# Patient Record
Sex: MALE | Race: BLACK OR AFRICAN AMERICAN
[De-identification: names, ages, dates, MRNs, and addresses within clinical notes are randomized per-mention and may not be internally consistent; named-entity substitution may affect disease eponyms.]

---

## 2018-02-20 ENCOUNTER — HOSPITAL ENCOUNTER (INPATIENT)
Dept: HOSPITAL 88 - ER | Age: 68
LOS: 1 days | DRG: 208 | End: 2018-02-21
Attending: INTERNAL MEDICINE | Admitting: INTERNAL MEDICINE
Payer: MEDICARE

## 2018-02-20 VITALS — SYSTOLIC BLOOD PRESSURE: 226 MMHG | DIASTOLIC BLOOD PRESSURE: 164 MMHG

## 2018-02-20 VITALS — DIASTOLIC BLOOD PRESSURE: 75 MMHG | SYSTOLIC BLOOD PRESSURE: 96 MMHG

## 2018-02-20 VITALS — DIASTOLIC BLOOD PRESSURE: 83 MMHG | SYSTOLIC BLOOD PRESSURE: 109 MMHG

## 2018-02-20 VITALS — SYSTOLIC BLOOD PRESSURE: 120 MMHG | DIASTOLIC BLOOD PRESSURE: 97 MMHG

## 2018-02-20 VITALS — SYSTOLIC BLOOD PRESSURE: 104 MMHG | DIASTOLIC BLOOD PRESSURE: 79 MMHG

## 2018-02-20 VITALS — DIASTOLIC BLOOD PRESSURE: 25 MMHG | SYSTOLIC BLOOD PRESSURE: 71 MMHG

## 2018-02-20 VITALS — DIASTOLIC BLOOD PRESSURE: 76 MMHG | SYSTOLIC BLOOD PRESSURE: 93 MMHG

## 2018-02-20 VITALS — DIASTOLIC BLOOD PRESSURE: 88 MMHG | SYSTOLIC BLOOD PRESSURE: 123 MMHG

## 2018-02-20 VITALS — DIASTOLIC BLOOD PRESSURE: 95 MMHG | SYSTOLIC BLOOD PRESSURE: 132 MMHG

## 2018-02-20 VITALS — DIASTOLIC BLOOD PRESSURE: 150 MMHG | SYSTOLIC BLOOD PRESSURE: 212 MMHG

## 2018-02-20 VITALS — WEIGHT: 172 LBS | HEIGHT: 68 IN | BODY MASS INDEX: 26.07 KG/M2

## 2018-02-20 VITALS — DIASTOLIC BLOOD PRESSURE: 81 MMHG | SYSTOLIC BLOOD PRESSURE: 102 MMHG

## 2018-02-20 VITALS — DIASTOLIC BLOOD PRESSURE: 95 MMHG | SYSTOLIC BLOOD PRESSURE: 126 MMHG

## 2018-02-20 VITALS — SYSTOLIC BLOOD PRESSURE: 128 MMHG | DIASTOLIC BLOOD PRESSURE: 96 MMHG

## 2018-02-20 VITALS — DIASTOLIC BLOOD PRESSURE: 129 MMHG | SYSTOLIC BLOOD PRESSURE: 188 MMHG

## 2018-02-20 VITALS — DIASTOLIC BLOOD PRESSURE: 83 MMHG | SYSTOLIC BLOOD PRESSURE: 106 MMHG

## 2018-02-20 VITALS — DIASTOLIC BLOOD PRESSURE: 158 MMHG | SYSTOLIC BLOOD PRESSURE: 215 MMHG

## 2018-02-20 VITALS — SYSTOLIC BLOOD PRESSURE: 101 MMHG | DIASTOLIC BLOOD PRESSURE: 83 MMHG

## 2018-02-20 VITALS — SYSTOLIC BLOOD PRESSURE: 121 MMHG | DIASTOLIC BLOOD PRESSURE: 95 MMHG

## 2018-02-20 VITALS — SYSTOLIC BLOOD PRESSURE: 119 MMHG | DIASTOLIC BLOOD PRESSURE: 89 MMHG

## 2018-02-20 VITALS — DIASTOLIC BLOOD PRESSURE: 99 MMHG | SYSTOLIC BLOOD PRESSURE: 155 MMHG

## 2018-02-20 VITALS — SYSTOLIC BLOOD PRESSURE: 68 MMHG | DIASTOLIC BLOOD PRESSURE: 48 MMHG

## 2018-02-20 VITALS — SYSTOLIC BLOOD PRESSURE: 103 MMHG | DIASTOLIC BLOOD PRESSURE: 80 MMHG

## 2018-02-20 DIAGNOSIS — N17.9: ICD-10-CM

## 2018-02-20 DIAGNOSIS — Z85.841: ICD-10-CM

## 2018-02-20 DIAGNOSIS — I10: ICD-10-CM

## 2018-02-20 DIAGNOSIS — Z95.5: ICD-10-CM

## 2018-02-20 DIAGNOSIS — E87.5: ICD-10-CM

## 2018-02-20 DIAGNOSIS — I46.9: ICD-10-CM

## 2018-02-20 DIAGNOSIS — J96.00: Primary | ICD-10-CM

## 2018-02-20 DIAGNOSIS — E87.4: ICD-10-CM

## 2018-02-20 DIAGNOSIS — I25.10: ICD-10-CM

## 2018-02-20 DIAGNOSIS — C34.90: ICD-10-CM

## 2018-02-20 DIAGNOSIS — Z87.891: ICD-10-CM

## 2018-02-20 DIAGNOSIS — Z66: ICD-10-CM

## 2018-02-20 DIAGNOSIS — C79.31: ICD-10-CM

## 2018-02-20 LAB
ALBUMIN SERPL-MCNC: 3 G/DL (ref 3.5–5)
ALBUMIN SERPL-MCNC: 3.4 G/DL (ref 3.5–5)
ALBUMIN/GLOB SERPL: 0.9 {RATIO} (ref 0.8–2)
ALBUMIN/GLOB SERPL: 0.9 {RATIO} (ref 0.8–2)
ALP SERPL-CCNC: 69 IU/L (ref 40–150)
ALP SERPL-CCNC: 79 IU/L (ref 40–150)
ALT SERPL-CCNC: 20 IU/L (ref 0–55)
ALT SERPL-CCNC: 71 IU/L (ref 0–55)
ANION GAP SERPL CALC-SCNC: 12.9 MMOL/L (ref 8–16)
ANION GAP SERPL CALC-SCNC: 24.2 MMOL/L (ref 8–16)
BACTERIA URNS QL MICRO: (no result) /HPF
BASE EXCESS BLDA CALC-SCNC: -11 MMOL/L (ref -2–3)
BASOPHILS # BLD AUTO: 0 10*3/UL (ref 0–0.1)
BASOPHILS # BLD AUTO: 0 10*3/UL (ref 0–0.1)
BASOPHILS NFR BLD AUTO: 0.1 % (ref 0–1)
BASOPHILS NFR BLD AUTO: 0.3 % (ref 0–1)
BILIRUB UR QL: NEGATIVE
BUN SERPL-MCNC: 21 MG/DL (ref 7–26)
BUN SERPL-MCNC: 26 MG/DL (ref 7–26)
BUN/CREAT SERPL: 13 (ref 6–25)
BUN/CREAT SERPL: 15 (ref 6–25)
CALCIUM SERPL-MCNC: 8 MG/DL (ref 8.4–10.2)
CALCIUM SERPL-MCNC: 8.7 MG/DL (ref 8.4–10.2)
CHLORIDE SERPL-SCNC: 112 MMOL/L (ref 98–107)
CHLORIDE SERPL-SCNC: 113 MMOL/L (ref 98–107)
CK MB SERPL-MCNC: 1.9 NG/ML (ref 0–5)
CK MB SERPL-MCNC: 4.4 NG/ML (ref 0–4.3)
CK SERPL-CCNC: 130 IU/L (ref 30–200)
CK SERPL-CCNC: 99 IU/L (ref 30–200)
CLARITY UR: (no result)
CO2 SERPL-SCNC: 22 MMOL/L (ref 22–29)
CO2 SERPL-SCNC: 7 MMOL/L (ref 22–29)
COLOR UR: YELLOW
DEPRECATED NEUTROPHILS # BLD AUTO: 12 10*3/UL (ref 2.1–6.9)
DEPRECATED NEUTROPHILS # BLD AUTO: 9.4 10*3/UL (ref 2.1–6.9)
DEPRECATED PHOSPHATE SERPL-MCNC: 8.6 MG/DL (ref 2.3–4.7)
DEPRECATED RBC URNS MANUAL-ACNC: (no result) /HPF (ref 0–5)
EGFRCR SERPLBLD CKD-EPI 2021: 39 ML/MIN (ref 60–?)
EGFRCR SERPLBLD CKD-EPI 2021: 52 ML/MIN (ref 60–?)
EOSINOPHIL # BLD AUTO: 0 10*3/UL (ref 0–0.4)
EOSINOPHIL # BLD AUTO: 0 10*3/UL (ref 0–0.4)
EOSINOPHIL NFR BLD AUTO: 0.3 % (ref 0–6)
EOSINOPHIL NFR BLD AUTO: 0.3 % (ref 0–6)
EPI CELLS URNS QL MICRO: (no result) /LPF
ERYTHROCYTE [DISTWIDTH] IN CORD BLOOD: 20.8 % (ref 11.7–14.4)
ERYTHROCYTE [DISTWIDTH] IN CORD BLOOD: 21 % (ref 11.7–14.4)
GLOBULIN PLAS-MCNC: 3.4 G/DL (ref 2.3–3.5)
GLOBULIN PLAS-MCNC: 3.6 G/DL (ref 2.3–3.5)
GLUCOSE SERPLBLD-MCNC: 192 MG/DL (ref 74–118)
GLUCOSE SERPLBLD-MCNC: 94 MG/DL (ref 74–118)
HCO3 BLDA-SCNC: 18 MMOL/L (ref 23–28)
HCT VFR BLD AUTO: 36.2 % (ref 38.2–49.6)
HCT VFR BLD AUTO: 39.7 % (ref 38.2–49.6)
HGB BLD-MCNC: 10.4 G/DL (ref 14–18)
HGB BLD-MCNC: 10.8 G/DL (ref 14–18)
KETONES UR QL STRIP.AUTO: NEGATIVE
LEUKOCYTE ESTERASE UR QL STRIP.AUTO: NEGATIVE
LYMPHOCYTES # BLD: 0.6 10*3/UL (ref 1–3.2)
LYMPHOCYTES # BLD: 1.9 10*3/UL (ref 1–3.2)
LYMPHOCYTES NFR BLD AUTO: 13.2 % (ref 18–39.1)
LYMPHOCYTES NFR BLD AUTO: 6.2 % (ref 18–39.1)
MAGNESIUM SERPL-MCNC: 2 MG/DL (ref 1.3–2.1)
MCH RBC QN AUTO: 25.7 PG (ref 28–32)
MCH RBC QN AUTO: 25.7 PG (ref 28–32)
MCHC RBC AUTO-ENTMCNC: 26.2 G/DL (ref 31–35)
MCHC RBC AUTO-ENTMCNC: 29.8 G/DL (ref 31–35)
MCV RBC AUTO: 86.2 FL (ref 81–99)
MCV RBC AUTO: 98 FL (ref 81–99)
MONOCYTES # BLD AUTO: 0.1 10*3/UL (ref 0.2–0.8)
MONOCYTES # BLD AUTO: 0.5 10*3/UL (ref 0.2–0.8)
MONOCYTES NFR BLD AUTO: 1.2 % (ref 4.4–11.3)
MONOCYTES NFR BLD AUTO: 3.2 % (ref 4.4–11.3)
NEUTS SEG NFR BLD AUTO: 81.1 % (ref 38.7–80)
NEUTS SEG NFR BLD AUTO: 91.3 % (ref 38.7–80)
NITRITE UR QL STRIP.AUTO: NEGATIVE
PCO2 BLDA: 58 MMHG (ref 41–51)
PCO2 BLDA: 73 MMHG (ref 80–105)
PH BLDA: 7.1 [PH] (ref 7.31–7.41)
PLATELET # BLD AUTO: 273 X10E3/UL (ref 140–360)
PLATELET # BLD AUTO: 315 X10E3/UL (ref 140–360)
POTASSIUM SERPL-SCNC: 3.9 MMOL/L (ref 3.5–5.1)
POTASSIUM SERPL-SCNC: 6.2 MMOL/L (ref 3.5–5.1)
PROT UR QL STRIP.AUTO: (no result)
RBC # BLD AUTO: 4.05 X10E6/UL (ref 4.3–5.7)
RBC # BLD AUTO: 4.2 X10E6/UL (ref 4.3–5.7)
SAO2 % BLDA: 88 % (ref 95–98)
SODIUM SERPL-SCNC: 138 MMOL/L (ref 136–145)
SODIUM SERPL-SCNC: 143 MMOL/L (ref 136–145)
SP GR UR STRIP: 1.02 (ref 1.01–1.02)
UROBILINOGEN UR STRIP-MCNC: 0.2 MG/DL (ref 0.2–1)

## 2018-02-20 PROCEDURE — 94002 VENT MGMT INPAT INIT DAY: CPT

## 2018-02-20 PROCEDURE — 83605 ASSAY OF LACTIC ACID: CPT

## 2018-02-20 PROCEDURE — 83735 ASSAY OF MAGNESIUM: CPT

## 2018-02-20 PROCEDURE — 86900 BLOOD TYPING SEROLOGIC ABO: CPT

## 2018-02-20 PROCEDURE — 80053 COMPREHEN METABOLIC PANEL: CPT

## 2018-02-20 PROCEDURE — 81001 URINALYSIS AUTO W/SCOPE: CPT

## 2018-02-20 PROCEDURE — 5A12012 PERFORMANCE OF CARDIAC OUTPUT, SINGLE, MANUAL: ICD-10-PCS | Performed by: EMERGENCY MEDICINE

## 2018-02-20 PROCEDURE — 87205 SMEAR GRAM STAIN: CPT

## 2018-02-20 PROCEDURE — 96375 TX/PRO/DX INJ NEW DRUG ADDON: CPT

## 2018-02-20 PROCEDURE — 82805 BLOOD GASES W/O2 SATURATION: CPT

## 2018-02-20 PROCEDURE — 87040 BLOOD CULTURE FOR BACTERIA: CPT

## 2018-02-20 PROCEDURE — 0BH17EZ INSERTION OF ENDOTRACHEAL AIRWAY INTO TRACHEA, VIA NATURAL OR ARTIFICIAL OPENING: ICD-10-PCS | Performed by: EMERGENCY MEDICINE

## 2018-02-20 PROCEDURE — 82550 ASSAY OF CK (CPK): CPT

## 2018-02-20 PROCEDURE — 36600 WITHDRAWAL OF ARTERIAL BLOOD: CPT

## 2018-02-20 PROCEDURE — 85025 COMPLETE CBC W/AUTO DIFF WBC: CPT

## 2018-02-20 PROCEDURE — 3E043XZ INTRODUCTION OF VASOPRESSOR INTO CENTRAL VEIN, PERCUTANEOUS APPROACH: ICD-10-PCS | Performed by: EMERGENCY MEDICINE

## 2018-02-20 PROCEDURE — 82553 CREATINE MB FRACTION: CPT

## 2018-02-20 PROCEDURE — 70450 CT HEAD/BRAIN W/O DYE: CPT

## 2018-02-20 PROCEDURE — 87086 URINE CULTURE/COLONY COUNT: CPT

## 2018-02-20 PROCEDURE — 93306 TTE W/DOPPLER COMPLETE: CPT

## 2018-02-20 PROCEDURE — 99284 EMERGENCY DEPT VISIT MOD MDM: CPT

## 2018-02-20 PROCEDURE — 86850 RBC ANTIBODY SCREEN: CPT

## 2018-02-20 PROCEDURE — 93005 ELECTROCARDIOGRAM TRACING: CPT

## 2018-02-20 PROCEDURE — 5A1935Z RESPIRATORY VENTILATION, LESS THAN 24 CONSECUTIVE HOURS: ICD-10-PCS | Performed by: EMERGENCY MEDICINE

## 2018-02-20 PROCEDURE — 71045 X-RAY EXAM CHEST 1 VIEW: CPT

## 2018-02-20 PROCEDURE — 84484 ASSAY OF TROPONIN QUANT: CPT

## 2018-02-20 PROCEDURE — 94640 AIRWAY INHALATION TREATMENT: CPT

## 2018-02-20 PROCEDURE — 36415 COLL VENOUS BLD VENIPUNCTURE: CPT

## 2018-02-20 PROCEDURE — 82948 REAGENT STRIP/BLOOD GLUCOSE: CPT

## 2018-02-20 PROCEDURE — 87071 CULTURE AEROBIC QUANT OTHER: CPT

## 2018-02-20 PROCEDURE — 84100 ASSAY OF PHOSPHORUS: CPT

## 2018-02-20 RX ADMIN — Medication PRN ML: at 22:20

## 2018-02-20 RX ADMIN — NITROGLYCERIN SCH GM: 20 OINTMENT TOPICAL at 18:53

## 2018-02-20 RX ADMIN — Medication PRN ML: at 21:20

## 2018-02-20 RX ADMIN — SODIUM CHLORIDE SCH GM: 9 INJECTION, SOLUTION INTRAVENOUS at 22:00

## 2018-02-20 RX ADMIN — Medication PRN ML: at 20:35

## 2018-02-20 RX ADMIN — Medication PRN ML: at 20:30

## 2018-02-20 RX ADMIN — Medication PRN ML: at 22:10

## 2018-02-20 RX ADMIN — Medication PRN ML: at 21:00

## 2018-02-20 RX ADMIN — Medication PRN ML: at 20:45

## 2018-02-20 RX ADMIN — Medication PRN ML: at 21:10

## 2018-02-20 RX ADMIN — Medication PRN ML: at 21:15

## 2018-02-20 RX ADMIN — NITROGLYCERIN SCH GM: 20 OINTMENT TOPICAL at 21:00

## 2018-02-20 RX ADMIN — Medication PRN ML: at 22:00

## 2018-02-20 RX ADMIN — Medication PRN ML: at 20:40

## 2018-02-20 RX ADMIN — Medication PRN ML: at 22:30

## 2018-02-20 NOTE — DIAGNOSTIC IMAGING REPORT
EXAMINATION:  CHEST SINGLE (PORTABLE)    



INDICATION:           Status post rapid response and intubation. 



COMPARISON:  None

     

FINDINGS:

TUBES and LINES:  Endotracheal tube is in good position below the thoracic

inlet in the distribution of the trachea 6.3 cm above the dia. There is a

left IJ central line catheter with tip overlying the left innominate vein with

a short segment of catheter looping in the distribution of the IJ.



LUNGS:  Right upper lobe airspace opacity  as associated with interlobular

septi thickening.      



PLEURA:  Pleural-based opacity in the right apex may represent loculated

effusion or pleural-based mass.



HEART AND MEDIASTINUM:  The cardiomediastinal silhouette is unremarkable. The

aorta is ectatic.



BONES AND SOFT TISSUES:  No acute osseous lesion.  Soft tissues are

unremarkable.



UPPER ABDOMEN: No free air under the diaphragm.    



IMPRESSION: 

Findings in the chest are suspicious for right upper lobe pneumonia, diffuse

interstitial pattern of fluid overload and right apical pleural based opacity,

indeterminate. CT of the chest with IV contrast is recommended.





Signed by: Dr. Chance Segura M.D. on 2/20/2018 9:33 PM

## 2018-02-20 NOTE — DIAGNOSTIC IMAGING REPORT
Examination: CT BRAIN WITHOUT CONTRAST



History:Syncope.

Comparison studies:None



Technique:

Axial images were obtained from the skull base to the vertex.

Coronal and sagittal images reconstructed from the axial data.

Intravenous contrast: None



Findings:



Scalp: No abnormalities.

Bones: Prior left suboccipital craniectomy and left occipital craniotomy and

deep to the craniectomy and craniotomy, there is cortical based area of

encephalomalacia involving the left mid and inferior cerebellum. Prior right

frontal griselda hole. No fractures, blastic or lytic lesions.



Brain sulci: Moderate volume loss for age.

Ventricles: Normal in size and configuration. No hydrocephalus.



Extra-axial space:

No abnormalities.



Parenchyma: 

A 4 mm dystrophic calcification is demonstrated in the right frontal

periventricular white matter, which could be related to prior infectious or

inflammatory process.

Chronic lacunar infarct is demonstrated in the right caudate head. 

There are mild  confluent areas of hypoattenuation in the periventricular and

subcortical white matter, nonspecific. 

No masses, hemorrhage, or acute cortical based vascular insults.



Sellar/suprasellar region: Partially CSF filled.

Craniocervical junction: Patent foramen magnum. No Chiari one malformation.



Incidental findings: 

Atherosclerotic calcification of the cavernous and supraclinoid internal

carotid arteries.



Impression:

 

1.  No acute intracranial abnormalities.



2.  Mild chronic microvascular ischemic change.



3.  Moderate volume loss for age.



4.  Right caudate head chronic lacunar infarct.



5.  Prior left suboccipital craniectomy and left occipital craniotomy with

cortical based area of encephalomalacia of the left mid and inferior

cerebellum.



6.  Prior right frontal griselda hole.



7.  Dystrophic calcification right frontal periventricular white matter, which

is the sequela of prior infectious or inflammatory process.



Signed by: Dr. Leonarda Chakraborty M.D. on 2/20/2018 3:15 PM

## 2018-02-20 NOTE — DIAGNOSTIC IMAGING REPORT
PROCEDURE:

A single AP view of the chest.

 

COMPARISON: None.

 

INDICATIONS:   SYNCOPE

     

FINDINGS:

Lines/tubes:  None.

 

Lungs:  The lungs are well inflated. Questionable ill-defined opacity 

in the right upper lobe. Rest of the lungs grossly clear..

 

Pleura:  There is no pleural effusion or pneumothorax.

 

Heart and mediastinum: Cardiac silhouette is unremarkable. Pulmonary 

vasculature is normal.

 

Bones:  No acute bony abnormality.

 

IMPRESSION: 

 

1. questionable ill-defined opacity in the right upper lobe, which may 

be secondary to aspiration in this patient with history of syncope. 

Chest PA and lateral would be helpful for further evaluation. 

 

 

Phill Cheema M.D.  

Dictated by:  Phill Cheema M.D. on 2/20/2018 at 15:37     

Electronically approved by:  Phill Cheema M.D. on 2/20/2018 at 

15:37

## 2018-02-20 NOTE — XMS REPORT
Patient Summary Document

 Created on: 2018



GARRISON BRANTLEY

External Reference #: [id number]

: 1950

Sex: Male



Demographics







 Address  [address] [place]  [address]

 

 Home Phone  [phone]

 

 Preferred Language  Unknown

 

 Marital Status  Unknown

 

 [employer]ist Affiliation  Unknown

 

 Race  Unknown

 

 Ethnic Group  Unknown





Author







 [facility]  [facility]

 

 Address  Unknown

 

 Phone  Unavailable







Care Team Providers







 Care Team Member Name  Role  Phone

 

 ANA SELBY  Unavailable  Unavailable







Problems

This patient has no known problems.



Allergies, Adverse Reactions, Alerts

This patient has no known allergies or adverse reactions.



Medications

This patient has no known medications.



Encounters







 Start Date/Time  End Date/Time  Encounter Type  Admission Type  Attending 
[facility]  Care Facility  Care Department  Encounter ID

 

 2018 23:29:44  2018 23:29:44  Emergency        [facility]  [id number]

 

 2018 19:57:38  2018 19:57:38  Emergency        [facility]  [id number]

 

 2018 18:55:18  2018 18:55:18  Emergency        [facility]  [id number]

 

 2017 00:00:00  2017 00:00:00  Outpatient        [facility]  [id number]

 

 2017 00:00:00  2017 00:00:00  Outpatient        [facility]  [id number]

 

 2017-10-13 13:57:37  2017-10-13 13:57:37  Outpatient        [facility]  [id number]

 

 2017-10-13 11:59:10  2017-10-13 11:59:10  Outpatient        [facility]  [id number]

 

 2017 10:51:56  2017 10:51:56  Outpatient        [facility]  [id number]

 

 2017 00:00:00  2017 00:00:00  Outpatient        [facility]  [id number]

 

 2017 00:00:00  2017 00:00:00  Outpatient        [facility]  [id number]

 

 2017 00:00:00  2017 00:00:00  Outpatient        [facility]  [id number]

 

 2017 08:47:46  2017 08:47:46  Outpatient        [facility]  [id number]







Results







 Test Description  Test Time  Test Comments  Text Results  Atomic Results  
Result Comments









 CT BRAIN [facility]   [address] [address]      Patient Name: GARRISON BRANTLEY   MR 
#: [id number]    : 1950 Age/Sex: 67/M  Acct #: [id number] Req #: [id number]
[id number]  Adm Physician:     Ordered by: ANA SELBY MD  Report #: [id number]
[id number]   Location: ER  Room/Bed:     _____________________________________________
______________________________________________________    Procedure: [id number] 
CT/CT BRAIN WO  Exam Date: 18                            Exam Time: 1450 
      REPORT STATUS: Signed    Examination: CT BRAIN WITHOUT CONTRAST      
History:Syncope.   Comparison studies:None      Technique:   Axial images were 
obtained from the skull base to the vertex.   Coronal and sagittal images 
reconstructed from the axial data.   Intravenous contrast: None      Findings: 
     Scalp: No abnormalities.   Bones: Prior left suboccipital craniectomy and 
left occipital craniotomy and   deep to the craniectomy and craniotomy, there 
is cortical based area of   encephalomalacia involving the left mid and 
inferior cerebellum. Prior right   frontal griselda hole. No fractures, blastic or 
lytic lesions.      Brain sulci: Moderate volume loss for age.   Ventricles: 
Normal in size and configuration. No hydrocephalus.      Extra-axial space:   
No abnormalities.      Parenchyma:    A 4 mm dystrophic calcification is 
demonstrated in the right frontal   periventricular white matter, which could 
be related to prior infectious or   inflammatory process.   Chronic lacunar 
infarct is demonstrated in the right caudate head.    There are mild  confluent 
areas of hypoattenuation in the periventricular and   subcortical white matter, 
nonspecific.    No masses, hemorrhage, or acute cortical based vascular 
insults.      Sellar/suprasellar region: Partially CSF filled.   Craniocervical 
junction: Patent foramen magnum. No Chiari one malformation.      Incidental 
findings:    Atherosclerotic calcification of the cavernous and supraclinoid 
internal   carotid arteries.      Impression:       1.  No acute intracranial 
abnormalities.      2.  Mild chronic microvascular ischemic change.      3.  
Moderate volume loss for age.      4.  Right caudate head chronic lacunar 
infarct.      5.  Prior left suboccipital craniectomy and left occipital 
craniotomy with   cortical based area of encephalomalacia of the left mid and 
inferior   cerebellum.      6.  Prior right frontal griselda hole.      7.  
Dystrophic calcification right frontal periventricular white matter, which   is 
the sequela of prior infectious or inflammatory process.      Signed by: Dr. Leonarda Chakraborty M.D. on 2018 3:15 PM        Dictated By: LEONARDA LEBRON MD  Electronically Signed By: LEONARDA OBRIEN MD on 18  Transcribed By: TORITO on 18       COPY TO:   ANA SELBY MD           

 

 CHEST SINGLE (NOT PORTABLE)            [address] [place] [address]      Patient Name: 
GARRISON BRANTLEY   MR #: [id number]    : 1950 Age/Sex: 67/M  Acct #: 
[id number] Req #: [id number]  Adm Physician:     Ordered by: ANA SELBY MD  Report #: [id number]   Location: ER  Room/Bed:     __________________________
_________________________________________________________________________    
Procedure: [id number] DX/CHEST SINGLE (NOT PORTABLE)  Exam Date: 18       
                     Exam Time: 1450       REPORT STATUS: Signed    PROCEDURE: 
  A single AP view of the chest.       COMPARISON: None.       INDICATIONS:   
SYNCOPE           FINDINGS:   Lines/tubes:  None.       Lungs:  The lungs are 
well inflated. Questionable ill-defined opacity    in the right upper lobe. 
Rest of the lungs grossly clear..       Pleura:  There is no pleural effusion 
or pneumothorax.       Heart and mediastinum: Cardiac silhouette is 
unremarkable. Pulmonary    vasculature is normal.       Bones:  No acute bony 
abnormality.       IMPRESSION:        1. questionable ill-defined opacity in 
the right upper lobe, which may    be secondary to aspiration in this patient 
with history of syncope.    Chest PA and lateral would be helpful for further 
evaluation.            Vitaly Cheema M.D.     Dictated by:  Vitaly Cheema M.D. on 2018 at 15:37        Electronically approved by:  Vitaly Cheema M.D. on 2018 at    15:37                Dictated By: VITALY CHEEMA MD  Electronically Signed By: VITALY CHEEMA MD on 18 1537  
Transcribed By: ELIZABETH on 18 1537       COPY TO:   ANA SELBY MD

## 2018-02-20 NOTE — DIAGNOSTIC IMAGING REPORT
Exam: Head CT without contrast

History:  Syncope, history of lung cancer

Comparison studies:  Head CT 2/20/2018



Technique:

Axial images were obtained from the skull base to the vertex.

Coronal and sagittal images reconstructed from the axial data.

Intravenous contrast: None



Findings:



Scalp and bones: No acute abnormalities. Surgical changes of left suboccipital

craniotomy.



Brain sulci: Mildly prominent.

Ventricles: Mild compensatory dilatation. No hydrocephalus.

Extra-axial spaces: No masses, no fluid collection. 



Parenchyma: 

No gross acute hemorrhage or acute cortical vascular insults. Chronic lacunar

insult in the head of the right caudate nucleus is unchanged. A few scattered

hypodensities in the supratentorial white matter are nonspecific but most

compatible with chronic small vessel ischemic changes. Subcentimeter

calcifications in the right frontal corona radiata is without surrounding edema

or mass effect. Subcentimeter calcification in the right cerebellar hemisphere

is with subtle surrounding hypodensity which may reflect gliosis or edema and

is otherwise without significant mass effect.



Unchanged encephalomalacia in the posterior left cerebellum beneath a left

suboccipital craniotomy. Sellar/suprasellar region: No abnormalities.

Craniocervical junction: Patent foramen magnum. No Chiari one malformation.





IMPRESSION:



Exam is limited by artifacts related to patient motion.

In spite of this limitation:

 

No gross acute intracranial abnormalities.

No gross changes from the previous head CT of 2/20/2018.



Persistent findings:

1.  Mild generalized volume loss.

2.  Mild chronic microvascular ischemic changes and chronic lacunar insult in

the right caudate nucleus.

3.  Encephalomalacia in the left cerebellum beneath a left suboccipital

craniotomy.

4.  Nonspecific right cerebellar subcentimeter calcification with surrounding

gliosis or edema without mass effect.

5.  Incidental dystrophic right frontal calcification.

6.  Old right frontal griselda hole.



If there remains persistent concern for metastatic disease, recommend brain MRI

without/with IV contrast to further evaluate when clinically appropriate for

the patient.



Signed by: Dr. Paras Tomas M.D. on 2/20/2018 10:07 PM

## 2018-02-21 RX ADMIN — SODIUM CHLORIDE SCH GM: 9 INJECTION, SOLUTION INTRAVENOUS at 05:43

## 2018-02-21 NOTE — CONSULTATION
DATE OF CONSULTATION:  2018 



PULMONARY MEDICINE CONSULT



REASON FOR REFERRAL:  Respiratory failure.



HISTORY:  Mr. Gould is a pleasant 67-year-old gentleman with respiratory 

failure.  Patient presented with syncope.  Patient came to emergency room.  

He had received chemotherapy reportedly today.  When he came to the 

emergency room, it was seen that he retained a lot of altered sensorium.  

Patient was admitted to the hospital.  Patient seemed to worsen.  During 

the course of the day, he went to more difficulty breathing.  He was 

eventually intubated.  He went to cardiac arrest x2.  He was seemed to be 

increasingly acidotic with acute kidney failure.  Patient was not on 

pressors, but it was noted he was having difficulty.  Family made him DNR 

during the course of the day and opted for more conservative treatments.  

During the course, he  on 2018 at 0032 hours.



PAST MEDICAL HISTORY:  Hypertension, stage 4 lung cancer including brain 

metastasis.



MEDICATIONS AND ALLERGIES:  Reviewed per record.  NOTABLY, THERE WERE NO 

KNOWN DRUG ALLERGIES.



SOCIAL HISTORY:  Patient unable to provide social history.  Multiple family 

at beside.



FAMILY HISTORY:  Noncontributory.



REVIEW OF SYSTEMS:  Cannot get as he is intubated.



OBJECTIVE:

VITAL SIGNS:  Unstable per record.  He appears to be passing away.

GENERAL:  Intubated, on ventilator.

HEENT:  Normocephalic, atraumatic.

NECK:  Supple.  Throat midline.

LUNGS:  Bilateral air entry, few rhonchi.

CARDIOVASCULAR:  S1 and S2.  No murmurs, rubs, or gallops.

ABDOMEN:  Soft, nontender.

EXTREMITIES:  No clubbing, no cyanosis.

INTEGUMENT:  No rash, no purpura.



LABS:  Labs have worsened significantly.  __________ bicarbonate, 6.2 

potassium, 26 BUN, 2.1 creatinine.  15 white count, 39 hematocrit, 315,000 

platelets.



IMPRESSION AND PLAN:

1. Acute respiratory failure.

2. Recurrent cardiac arrest.

3. Stage 4 lung cancer.

4. Acute kidney injury.

5. Worsening hyperkalemia.

6. Severe metabolic acidosis and additional respiratory acidosis.

7. Multiple issues addressed.

8. Do not resuscitate.



Patient was later declared .  Time of death was 0032 hours on 

2018.  Family was at bedside and they were notified.  

Discussed with other practitioners.



Thank you very much for this consult.  My condolences out to the family.



 



DD:  2018 00:58

DT:  2018 01:01

Job#:  [id number]

## 2018-02-22 NOTE — CONSULTATION
DATE OF CONSULTATION:  February 20, 2018 



CRITICAL CARE/CARDIOVASCULAR CONSULTATION NOTE



REASON FOR CONSULTATION:  Cardiac arrest.



HPI:  Mr. Gould is a 67-year-old gentleman with a history of coronary artery 

disease, prior stent placement, on dual antiplatelet therapy and 

appropriate medical therapy for coronary artery disease.  He also has lung 

cancer, receiving palliative chemotherapy including prior therapy for 

metastasis to his brain.  He was in Dr. Tapia's office.  He had a syncopal 

episode while not on oxygen.  He was brought to the emergency room where he 

was hemodynamically stable, admitted to the floor.  However, he continued 

to have bradycardia, subsequent asystole, cardiac arrest, requiring 

resuscitation and intubation.  He was transferred to the ICU.



When I saw him, he was actively bradycardiac.  We started administrating 

bicarbonate for severe metabolic acidosis, bicarbonate was 7, along with 

acute renal failure and possible septic shock, marked elevation of lactate 

consistent with lactic acidosis probably from intra-abdominal pathology.



During the course of his care, a total of 105 minutes was spent including 

CPR for 40 minutes, administration of multiple vasopressor drugs to 

maintain blood pressure and heart rate support as discussions with family 

were ongoing.



PAST MEDICAL HISTORY:  As listed above.



SOCIAL HISTORY:  Unavailable.



REVIEW OF SYSTEMS:  Unavailable as the patient is intubated.



PHYSICAL EXAMINATION

VITAL SIGNS:  Afebrile, heart rate 42, blood pressure is 70/25, and O2 sats 

are 88%.

CARDIOVASCULAR:  Regular rhythm, bradycardic.  No gallops.

LUNGS:  Fine crackles and rhonchi bilaterally.

ABDOMEN:  Mildly distended and soft with absent bowel sounds.

EXTREMITIES:  Pedal pulses are absent.  No edema.

NEUROLOGIC:  The patient is unresponsive, sedated on propofol.



LABS:  Reviewed.



Chest x-ray shows cardiomegaly.



Echocardiogram shows hyperdynamic LV function, moderate pulmonary 

hypertension.



Remaining CT scan of the brain was also reviewed.



ASSESSMENT

1. Cardiac arrest due to severe metabolic derangement, most likely due 

to lactic acidosis from unknown intra-abdominal pathology.

2. Coronary artery disease with prior stent placement.



RECOMMENDATION:  [place] intravenous fluids.  CPR and resuscitation as 

indicated.  The above-mentioned course of care in the hospital was 

described in the history of present illness above.



After extensive discussion with the patient, the patient was made to 

Do-Not-Resuscitate.  He continued to remain bradycardic, developed 

asystole, and was pronounced dead in the presence of his family.



Total critical care time spent 105 minutes including documentation, review 

of the chart, discussion with family and patient care.  CPR was performed 

for 40 minutes.



I thank Dr. Tapia for this consultation.









DD:  02/21/2018 00:11

DT:  02/21/2018 00:54

Job#:  [id number]